# Patient Record
Sex: FEMALE
[De-identification: names, ages, dates, MRNs, and addresses within clinical notes are randomized per-mention and may not be internally consistent; named-entity substitution may affect disease eponyms.]

---

## 2018-03-20 ENCOUNTER — HOSPITAL ENCOUNTER (INPATIENT)
Dept: HOSPITAL 46 - FBC | Age: 28
LOS: 10 days | Discharge: HOME | End: 2018-03-30
Attending: OBSTETRICS & GYNECOLOGY | Admitting: OBSTETRICS & GYNECOLOGY
Payer: COMMERCIAL

## 2018-03-20 VITALS — HEIGHT: 60.98 IN | BODY MASS INDEX: 55.32 KG/M2 | WEIGHT: 293 LBS

## 2018-03-20 DIAGNOSIS — Z3A.37: ICD-10-CM

## 2018-03-20 DIAGNOSIS — E66.01: ICD-10-CM

## 2018-03-20 DIAGNOSIS — F41.9: ICD-10-CM

## 2018-03-20 DIAGNOSIS — N85.8: ICD-10-CM

## 2018-03-20 DIAGNOSIS — Z79.899: ICD-10-CM

## 2018-03-20 DIAGNOSIS — O36.63X0: ICD-10-CM

## 2018-03-20 DIAGNOSIS — O34.211: Primary | ICD-10-CM

## 2018-03-20 PROCEDURE — C1763 CONN TISS, NON-HUMAN: HCPCS

## 2018-03-28 NOTE — NUR
03/28/18 1421 Renita Cota
1358 PT ARRIFVED TO ROOM AWAKE AND TALKING. RESP EVEN AND UNLABORED ON
ROOM AIR. VSS. PT REPORTS PAIN 2/10 AND NO NAUSEA. TWO IVS IN PLACE
ONE IN EACH HAND, LEFT HAND INFUSING LR WITH 20 OF PIT. LOOK AT [facility]
CHARTING FOR REST OF VS.
1404 .
1418 [facility] RN AT BEDSIDE TALKING TO PT.

## 2018-04-12 NOTE — OR
[address]  [address]
_________________________________________________________________________________________
                                                                 Signed   
 
 
DATE OF OPERATION:
2018
 
SURGEON:
Timothy Ferrari DO
 
PREOPERATIVE DIAGNOSES:
1. Term intrauterine pregnancy.
2. History of prior  x2.
3. Insulin-dependent gestational diabetes mellitus with poor control.
4. History of shoulder dystocia.
5. Morbid obesity.
 
POSTOPERATIVE DIAGNOSES:
1. Term intrauterine pregnancy.
2. History of prior  x2.
3. Insulin-dependent gestational diabetes mellitus with poor control.
4. History of shoulder dystocia.
5. Morbid obesity.
6. Uterine windown w/ at lower uterine segment.
 
SURGEON:
JJ Ferrari DO
 
ASSISTANT:
Ruddy Jasso MD
 
ANESTHESIA:
Spinal.
 
ESTIMATED BLOOD LOSS:
750 mL.
 
COMPLICATIONS:
None.
 
SPECIMENS:
None.
 
FINDINGS:
Viable female  weighing 8 pounds 3 ounces with Apgars of 8 and 9 at 1 and 5
minutes respectively.  Estimated blood loss 750 mL.  Normal tubes and ovaries
 
    Electronically Signed By: TIMOTHY FERRARI DO  18 1243
_________________________________________________________________________________________
PATIENT NAME:     RD BOSTONTIFFANIE                   
MEDICAL RECORD #: [id number]            OPERATIVE REPORT              
          ACCT #: [id number]  
DATE OF BIRTH:   [birth year]            REPORT #: [id number]      
PHYSICIAN:        TIMOTHY FERRARI DO               
PCP:              NO PRIMARY CARE PHYSICIAN     
REPORT IS CONFIDENTIAL AND NOT TO BE RELEASED WITHOUT AUTHORIZATION
 
 
                                  [address]  [address]
_________________________________________________________________________________________
                                                                 Signed   
 
 
bilaterally.  The lower uterine segment with uterine window noted with
little to no myometrium overlying  scar w/ clear fliud noted beyond serosa.
The hysterotomy was closed in two layers, but
remains thin.  Hemostasis was at the end of the procedure.  The bladder was backfilled
and found to be intact and normal. 
 
COMPLICATIONS:
None.
 
INDICATIONS:
Ms. Rd Boston is a pleasant 27-year-old, G5, P4, with two prior C-sections with
intrauterine pregnancy at 37 weeks and 6 days gestation.  Her pregnancy is complicated
by insulin-dependent gestational diabetes with poor control despite increasing doses of
insulin.  She also has a history of shoulder dystocia, morbid obesity, and prior
 x2.  Due to her difficulty in controlling her diabetes, it was decided to
proceed with repeat low transverse  section at 37 weeks and 6 days gestation.
Risks, benefits, and alternatives were discussed in detail and the patient understands
and wished to proceed with the procedure.  Of note, the patient's blood sugar was
slightly elevated in 130s upon admission and an insulin drip was initiated and glucose
was 111 at the start of the case. 
 
TECHNIQUE:
The patient was taken to the operating room where a time-out was performed to confirm
correct patient and correct procedure.  Spinal anesthesia was adequately established and
the patient was prepped in the supine position with a bump on her right hip.  Russell
catheter was inserted.  ICPs were on and running and Ancef 3 g preoperatively were given
per skip protocol.  No preop heparin was indicated.  A Pfannenstiel skin incision was
made through the prior Pfannenstiel scar and carried down to and through the fascia in
the midline.  The fascia was nicked in the midline and extended bilaterally using sharp
dissection with James scissors.  The fascia was grasped with Kocher's, elevated, and the
underlying rectus muscle dissected off sharply and bluntly.  The rectus was then divided
in the midline using blunt dissection and the peritoneum grasped with hemostats,
elevated and incised sharply.  Peritoneal incision was extended bilaterally using a
combination of sharp and blunt dissection.  Upon examining the lower uterine segment, it
was noted to be ballooning with little to no myometrium overlying the prior 
scar.  An Carrillo uterine retractor was placed and hysterotomy was performed using a
surgical scalpel.  Hysterotomy was extended bilaterally using blunt dissection.  The
surgeon's hand was then placed into the uterine cavity.  The fetal head elevated and
delivered in the JORGE position without difficulty.  The remainder of the 
delivered with the assistance of fundal pressure without any difficulty.  Upon delivery,
the  was vigorous and cried and the oral nasopharynx were bulb suctioned.  The
cord was doubly clamped and cut and the  was handed to the waiting pediatric team
 
    Electronically Signed By: TIMOTHY FERRARI DO  18 1243
_________________________________________________________________________________________
PATIENT NAME:     TIFFANIE HERNÁNDEZ                   
MEDICAL RECORD #: [id number]            OPERATIVE REPORT              
          ACCT #: [id number]  
DATE OF BIRTH:   [birth year]            REPORT #: [id number]      
PHYSICIAN:        TIMOTHY FERRARI DO               
PCP:              NO PRIMARY CARE PHYSICIAN     
REPORT IS CONFIDENTIAL AND NOT TO BE RELEASED WITHOUT AUTHORIZATION
 
 
                                  [facility]
                                    [address] [place] [address]  [id number]
_________________________________________________________________________________________
                                                                 Signed   
 
 
for further care.  Cord blood was obtained for routine analysis and the placenta was
expressed intact with a centrally inserted three-vessel cord.  The uterine cavity was
cleared off any remaining products of conception or clot and was noted to be firming
with Pitocin.  Hysterotomy was closed using 0 Vicryl in a running locked suture.  The
bladder was then backfilled with 210 mL of sterile milk and the bladder was noted to be
well away from the hysterotomy.  With the bladder backfilled, a 2nd horizontal
imbricating suture of 0 Monocryl was applied to reinforce and imbricate the hysterotomy.
 Good hemostasis was appreciated; however, the lower uterine segment remains thin.  This
was discussed intraoperatively with the patient and her  and we recommended that
she pursue no further pregnancies given the risk of uterine rupture.  The lower uterine
segment was then irrigated and found to be hemostatic and Evicel was applied at the
lower uterine segment.  The Carrillo uterine manipulator was removed.  The pelvis is
examined and found to have normal bilateral ovaries and tubes.  There are some omental
adhesions to the peritoneum just above the peritoneal incision.  These were not taken
down.  ACell sheet was then applied to the lower uterine segment and the peritoneum was
reapproximated with 2-0 Vicryl in a running nonlocked suture.  The rectus was then
reapproximated using 0 Vicryl in interrupted simple sutures and the rectus was examined
and made hemostatic with electrical Bovie cautery.  ACell powder was then applied.
Fascia was reapproximated using 0 Vicryl and a running nonlocked suture after ensuring
that the rectus was hemostatic.  Subcu was then made hemostatic with electrocautery with
a Bovie and ACell powder was also applied.  The subcu was closed in two layers of 3-0
Vicryl and running nonlocked sutures.  The skin was reapproximated using Quill in a
subcuticular stitch with good hemostasis and cosmesis.  The uterus was Crede'd for scant
blood and noted to be firm.  The patient was then taken to the PACU in good and stable
condition. 
 
Sponge, needle, and instrument count was correct x2 at the end the procedure.  Dr. Jasso was present and participated in all portions of the procedure. 
 
 
 
            ________________________________________
            DO TUNG Dewitt/HECTOR
Job #:  [id number]
DD:  2018 14:21:42
DT:  2018 20:50:16
 
 
Copies:                                
 
    Electronically Signed By: TIMOTHY FERRARI, DO  18 1243
_________________________________________________________________________________________
PATIENT NAME:     TIFFANIE HERNÁNDEZ                   
MEDICAL RECORD #: [id number]            OPERATIVE REPORT              
          ACCT #: [id number]  
DATE OF BIRTH:   [birth year]            REPORT #: [id number]      
PHYSICIAN:        TIMOTHY FERRARI DO               
PCP:              NO PRIMARY CARE PHYSICIAN     
REPORT IS CONFIDENTIAL AND NOT TO BE RELEASED WITHOUT AUTHORIZATION
 
 
                                  [address] [place]  [address]
_________________________________________________________________________________________
                                                                 Signed   
 
 
~
 
 
 
 
 
 
 
 
 
 
 
 
 
 
 
 
 
 
 
 
 
 
 
 
 
 
 
 
 
 
 
 
 
 
 
 
 
 
 
 
 
 
    Electronically Signed By: TIMOTHY FERRARI DO  18 1243
_________________________________________________________________________________________
PATIENT NAME:     TIFFANIE HERNÁNDEZ                   
MEDICAL RECORD #: [id number]            OPERATIVE REPORT              
          ACCT #: [id number]  
DATE OF BIRTH:   [birth year]            REPORT #: [id number]      
PHYSICIAN:        TIMOTHY FERRARI DO               
PCP:              NO PRIMARY CARE PHYSICIAN     
REPORT IS CONFIDENTIAL AND NOT TO BE RELEASED WITHOUT AUTHORIZATION